# Patient Record
Sex: FEMALE | Race: BLACK OR AFRICAN AMERICAN | NOT HISPANIC OR LATINO | Employment: UNEMPLOYED | URBAN - METROPOLITAN AREA
[De-identification: names, ages, dates, MRNs, and addresses within clinical notes are randomized per-mention and may not be internally consistent; named-entity substitution may affect disease eponyms.]

---

## 2022-10-13 PROBLEM — R41.3 AMNESIA: Status: ACTIVE | Noted: 2022-10-13
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
## 2024-07-09 PROBLEM — S06.33AA INTRAPARENCHYMAL HEMATOMA OF BRAIN: Status: ACTIVE | Noted: 2024-07-09

## 2024-07-10 PROBLEM — I10 HTN (HYPERTENSION): Status: ACTIVE | Noted: 2024-07-10

## 2024-07-10 PROBLEM — M19.90 ARTHRITIS: Status: ACTIVE | Noted: 2024-07-10

## 2024-07-10 PROBLEM — D89.2 PARAPROTEINEMIA: Status: ACTIVE | Noted: 2024-07-10

## 2024-07-10 PROBLEM — G93.41 ENCEPHALOPATHY, METABOLIC: Status: ACTIVE | Noted: 2024-07-10

## 2024-07-10 PROBLEM — E87.1 HYPONATREMIA: Status: ACTIVE | Noted: 2024-07-10

## 2024-07-11 PROBLEM — R41.82 ALTERED MENTAL STATUS: Status: ACTIVE | Noted: 2024-07-11

## 2024-07-11 PROBLEM — E87.1 HYPONATREMIA: Status: RESOLVED | Noted: 2024-07-10 | Resolved: 2024-07-11

## 2024-07-12 PROBLEM — G93.41 ENCEPHALOPATHY, METABOLIC: Status: RESOLVED | Noted: 2024-07-10 | Resolved: 2024-07-12

## 2024-07-12 PROBLEM — R41.82 ALTERED MENTAL STATUS: Status: RESOLVED | Noted: 2024-07-11 | Resolved: 2024-07-12

## 2024-08-06 ENCOUNTER — PATIENT OUTREACH (OUTPATIENT)
Dept: ADMINISTRATIVE | Facility: HOSPITAL | Age: 69
End: 2024-08-06

## 2024-09-03 ENCOUNTER — PATIENT MESSAGE (OUTPATIENT)
Dept: ADMINISTRATIVE | Facility: HOSPITAL | Age: 69
End: 2024-09-03

## 2024-11-11 PROBLEM — R56.9 SEIZURES: Status: ACTIVE | Noted: 2024-11-11

## 2024-11-12 PROBLEM — I63.9 CEREBROVASCULAR ACCIDENT (CVA): Status: ACTIVE | Noted: 2024-11-12

## 2024-11-14 PROBLEM — I10 HYPERTENSION: Status: ACTIVE | Noted: 2024-11-14

## 2024-11-14 PROBLEM — Z72.0 TOBACCO ABUSE: Status: ACTIVE | Noted: 2024-11-14

## 2024-11-14 PROBLEM — G93.40 ENCEPHALOPATHY: Status: ACTIVE | Noted: 2024-11-14

## 2024-11-14 PROBLEM — G40.109: Status: ACTIVE | Noted: 2024-11-14

## 2024-11-14 PROBLEM — B18.2 CHRONIC HEPATITIS C WITHOUT HEPATIC COMA: Status: ACTIVE | Noted: 2024-11-14

## 2024-11-15 PROBLEM — R07.9 CHEST PAIN: Status: ACTIVE | Noted: 2024-11-15

## 2024-11-20 PROBLEM — Q01.9 ENCEPHALOCELE: Status: ACTIVE | Noted: 2024-11-20

## 2024-11-21 ENCOUNTER — PATIENT MESSAGE (OUTPATIENT)
Dept: ADMINISTRATIVE | Facility: HOSPITAL | Age: 69
End: 2024-11-21

## 2025-04-17 ENCOUNTER — PATIENT OUTREACH (OUTPATIENT)
Dept: ADMINISTRATIVE | Facility: HOSPITAL | Age: 70
End: 2025-04-17

## 2025-04-17 PROBLEM — F19.959 SUBSTANCE OR MEDICATION-INDUCED PSYCHOTIC DISORDER: Status: ACTIVE | Noted: 2025-04-17

## 2025-04-20 PROBLEM — R44.3 HALLUCINATIONS: Status: ACTIVE | Noted: 2025-04-20

## 2025-04-21 PROBLEM — K74.60 CIRRHOSIS: Status: ACTIVE | Noted: 2025-04-21

## 2025-04-22 PROBLEM — R74.8 ELEVATED CK: Status: ACTIVE | Noted: 2025-04-22
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
## 2025-04-23 PROBLEM — E87.1 HYPONATREMIA: Status: ACTIVE | Noted: 2025-04-23

## 2025-04-23 PROBLEM — E87.6 HYPOKALEMIA: Status: ACTIVE | Noted: 2025-04-23
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
## 2025-04-25 PROBLEM — R45.1 AGITATION: Status: ACTIVE | Noted: 2025-04-25

## 2025-05-22 ENCOUNTER — TELEPHONE (OUTPATIENT)
Dept: ADMINISTRATIVE | Facility: HOSPITAL | Age: 70
End: 2025-05-22

## 2025-05-22 ENCOUNTER — PATIENT OUTREACH (OUTPATIENT)
Dept: ADMINISTRATIVE | Facility: HOSPITAL | Age: 70
End: 2025-05-22

## 2025-05-22 DIAGNOSIS — Z12.12 ENCOUNTER FOR COLORECTAL CANCER SCREENING: Primary | ICD-10-CM

## 2025-05-22 DIAGNOSIS — Z12.11 ENCOUNTER FOR COLORECTAL CANCER SCREENING: Primary | ICD-10-CM

## 2025-05-22 NOTE — PROGRESS NOTES
5-22-25 follow up on campaign message regarding c-scope, spoke with pt daughter, request for cologuard kit, order was placed

## 2025-08-25 ENCOUNTER — PATIENT OUTREACH (OUTPATIENT)
Dept: ADMINISTRATIVE | Facility: HOSPITAL | Age: 70
End: 2025-08-25